# Patient Record
Sex: FEMALE | Race: WHITE | NOT HISPANIC OR LATINO | Employment: FULL TIME | URBAN - METROPOLITAN AREA
[De-identification: names, ages, dates, MRNs, and addresses within clinical notes are randomized per-mention and may not be internally consistent; named-entity substitution may affect disease eponyms.]

---

## 2017-08-02 ENCOUNTER — OFFICE VISIT (OUTPATIENT)
Dept: OTOLARYNGOLOGY | Facility: CLINIC | Age: 37
End: 2017-08-02
Payer: COMMERCIAL

## 2017-08-02 VITALS
HEART RATE: 78 BPM | BODY MASS INDEX: 21.85 KG/M2 | WEIGHT: 135.38 LBS | TEMPERATURE: 97 F | SYSTOLIC BLOOD PRESSURE: 115 MMHG | DIASTOLIC BLOOD PRESSURE: 74 MMHG

## 2017-08-02 DIAGNOSIS — J35.8 TONSILLAR MASS: Primary | ICD-10-CM

## 2017-08-02 PROCEDURE — 42800 BIOPSY OF THROAT: CPT | Mod: S$GLB,,, | Performed by: OTOLARYNGOLOGY

## 2017-08-02 PROCEDURE — 99203 OFFICE O/P NEW LOW 30 MIN: CPT | Mod: 25,S$GLB,, | Performed by: OTOLARYNGOLOGY

## 2017-08-02 PROCEDURE — 88305 TISSUE EXAM BY PATHOLOGIST: CPT | Performed by: PATHOLOGY

## 2017-08-02 PROCEDURE — 88305 TISSUE EXAM BY PATHOLOGIST: CPT | Mod: 26,,, | Performed by: PATHOLOGY

## 2017-08-02 PROCEDURE — 99999 PR PBB SHADOW E&M-EST. PATIENT-LVL III: CPT | Mod: PBBFAC,,, | Performed by: OTOLARYNGOLOGY

## 2017-08-02 NOTE — PROGRESS NOTES
Chief Complaint   Patient presents with    Consult     swollen tonsils/tickle feeling         37 y.o. female presents with one year history of left tonsillar mass. No associated pain or bleeding, but she does occasionally feel it in the back of her throat.       Review of Systems     Constitutional: Negative for fatigue and unexpected weight change.   HENT: per HPI.  Eyes: Negative for visual disturbance.   Respiratory: Negative for shortness of breath, hemoptysis  Cardiovascular: Negative for chest pain and palpitations.   Genitourinary: Negative for dysuria and difficulty urinating.   Musculoskeletal: Negative for decreased ROM, back pain.   Skin: Negative for rash, sunburn, itching.   Neurological: Negative for dizziness and seizures.   Hematological: Negative for adenopathy. Does not bruise/bleed easily.   Psychiatric/Behavioral: Negative for agitation. The patient is not nervous/anxious.   Endocrine: Negative for rapid weight loss/weight gain, heat/cold intolerance.     History reviewed. No pertinent past medical history.    Past Surgical History:   Procedure Laterality Date    ANKLE FRACTURE SURGERY  08/2014    plate and 5 screws    ANKLE SURGERY         family history is not on file.    Pt  reports that she has been smoking.  She has never used smokeless tobacco. She reports that she drinks alcohol. She reports that she does not use drugs.    Review of patient's allergies indicates:  No Known Allergies     Physical Exam    Vitals:    08/02/17 1318   BP: 115/74   Pulse: 78   Temp: 96.9 °F (36.1 °C)     Body mass index is 21.85 kg/m².    Physical Exam   Constitutional: She is oriented to person, place, and time. She appears well-developed and well-nourished. No distress.   HENT:   Head: Normocephalic and atraumatic.   Right Ear: Hearing, tympanic membrane, external ear and ear canal normal. Tympanic membrane mobility is normal. No middle ear effusion. No decreased hearing is noted.   Left Ear: Hearing,  tympanic membrane, external ear and ear canal normal. Tympanic membrane mobility is normal.  No middle ear effusion. No decreased hearing is noted.   Nose: Nose normal.   Mouth/Throat: Uvula is midline, oropharynx is clear and moist and mucous membranes are normal. No oral lesions. Tonsils are 1+ on the right. Tonsils are 1+ on the left.       Eyes: Conjunctivae, EOM and lids are normal. Pupils are equal, round, and reactive to light. Right eye exhibits no discharge. Left eye exhibits no discharge.   Neck: Trachea normal, normal range of motion and phonation normal. Neck supple. No tracheal tenderness present. No tracheal deviation, no edema and no erythema present. No thyroid mass and no thyromegaly present.   Cardiovascular: Normal heart sounds.    Pulmonary/Chest: Breath sounds normal. No stridor.   Abdominal: Soft.   Lymphadenopathy:     She has no cervical adenopathy.   Neurological: She is alert and oriented to person, place, and time.   Skin: Skin is warm and dry. No rash noted. She is not diaphoretic. No erythema. No pallor.   Psychiatric: She has a normal mood and affect.   Nursing note and vitals reviewed.      Procedure note:  Once informed consent was obtained, patient was sprayed with topical 4% lidocaine to the back of the throat. Once this took effect, the left tonsillar mass was grasped with a forceps and excised with a sharp scissor. Bleeding was minimal. She tolerated the procedure well.       Assessment     1. Tonsillar mass          Plan  In summary, Ms. Huang is a 37 year old female with one year history of left tonsillar polyp, excised today in clinic. I will call her with the pathology results. All questions were answered.

## 2019-08-19 ENCOUNTER — OFFICE VISIT (OUTPATIENT)
Dept: URGENT CARE | Facility: CLINIC | Age: 39
End: 2019-08-19
Payer: COMMERCIAL

## 2019-08-19 VITALS
TEMPERATURE: 98 F | RESPIRATION RATE: 20 BRPM | HEIGHT: 66 IN | BODY MASS INDEX: 21.21 KG/M2 | SYSTOLIC BLOOD PRESSURE: 110 MMHG | OXYGEN SATURATION: 99 % | WEIGHT: 132 LBS | HEART RATE: 83 BPM | DIASTOLIC BLOOD PRESSURE: 60 MMHG
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
## 2019-08-19 PROCEDURE — 99214 OFFICE O/P EST MOD 30 MIN: CPT | Mod: S$GLB,,, | Performed by: NURSE PRACTITIONER

## 2019-08-19 PROCEDURE — 99214 PR OFFICE/OUTPT VISIT, EST, LEVL IV, 30-39 MIN: ICD-10-PCS | Mod: S$GLB,,, | Performed by: NURSE PRACTITIONER

## 2019-08-19 RX ORDER — BENZONATATE 100 MG/1
CAPSULE ORAL
COMMUNITY

## 2019-08-19 RX ORDER — MUPIROCIN 20 MG/G
OINTMENT TOPICAL
Qty: 22 G | Refills: 1 | Status: SHIPPED | OUTPATIENT
Start: 2019-08-19

## 2019-08-19 NOTE — PROGRESS NOTES
"Subjective:       Patient ID: Celina Huang is a 39 y.o. female.    Vitals:  height is 5' 6" (1.676 m) and weight is 59.9 kg (132 lb). Her oral temperature is 98 °F (36.7 °C). Her blood pressure is 110/60 and her pulse is 83. Her respiration is 20 and oxygen saturation is 99%.     Chief Complaint: Insect Bite (Right upper arm)    Patient here today with c/o Right upper arm insect bite x's 4 days ago. Patient reports she did not see what bit you.    Insect Bite   This is a new problem. The current episode started in the past 7 days. The problem has been gradually worsening. Pertinent negatives include no abdominal pain, anorexia, arthralgias, change in bowel habit, chest pain, chills, congestion, coughing, diaphoresis, fatigue, fever, headaches, joint swelling, myalgias, nausea, neck pain, numbness, rash, sore throat, swollen glands, urinary symptoms, vertigo, visual change, vomiting or weakness. Nothing aggravates the symptoms. Treatments tried: T Tree oil. The treatment provided no relief.       Constitution: Negative for chills, sweating, fatigue and fever.   HENT: Negative for congestion and sore throat.    Neck: Negative for neck pain and painful lymph nodes.   Cardiovascular: Negative for chest pain and leg swelling.   Eyes: Negative for double vision and blurred vision.   Respiratory: Negative for cough and shortness of breath.    Gastrointestinal: Negative for abdominal pain, nausea, vomiting and diarrhea.   Genitourinary: Negative for dysuria, frequency, urgency and history of kidney stones.   Musculoskeletal: Negative for joint pain, joint swelling, muscle cramps and muscle ache.   Skin: Negative for color change, pale, rash and bruising.   Allergic/Immunologic: Negative for seasonal allergies.   Neurological: Negative for dizziness, history of vertigo, light-headedness, passing out, headaches and numbness.   Hematologic/Lymphatic: Negative for swollen lymph nodes.   Psychiatric/Behavioral: Negative " for nervous/anxious, sleep disturbance and depression. The patient is not nervous/anxious.        Objective:      Physical Exam   Constitutional: She is oriented to person, place, and time. She appears well-developed and well-nourished. She is cooperative.  Non-toxic appearance. She does not appear ill. No distress.   HENT:   Head: Normocephalic and atraumatic.   Right Ear: Hearing, tympanic membrane, external ear and ear canal normal.   Left Ear: Hearing, tympanic membrane, external ear and ear canal normal.   Nose: Nose normal. No mucosal edema, rhinorrhea or nasal deformity. No epistaxis. Right sinus exhibits no maxillary sinus tenderness and no frontal sinus tenderness. Left sinus exhibits no maxillary sinus tenderness and no frontal sinus tenderness.   Mouth/Throat: Uvula is midline, oropharynx is clear and moist and mucous membranes are normal. No trismus in the jaw. Normal dentition. No uvula swelling. No posterior oropharyngeal erythema.   Eyes: Conjunctivae and lids are normal. Right eye exhibits no discharge. Left eye exhibits no discharge. No scleral icterus.   Sclera clear bilat   Neck: Trachea normal, normal range of motion, full passive range of motion without pain and phonation normal. Neck supple.   Cardiovascular: Normal rate, regular rhythm, normal heart sounds, intact distal pulses and normal pulses.   Pulmonary/Chest: Effort normal and breath sounds normal. No respiratory distress.   Abdominal: Soft. Normal appearance and bowel sounds are normal. She exhibits no distension, no pulsatile midline mass and no mass. There is no tenderness.   Musculoskeletal: Normal range of motion. She exhibits no edema or deformity.   Neurological: She is alert and oriented to person, place, and time. She exhibits normal muscle tone. Coordination normal.   Skin: Skin is warm, dry and intact. She is not diaphoretic. No pallor.        Psychiatric: She has a normal mood and affect. Her speech is normal and behavior is  normal. Judgment and thought content normal. Cognition and memory are normal.   Nursing note and vitals reviewed.      Assessment:       1. Insect bite, initial encounter        Plan:       Patient has put tea tree oil and has used essential oils on insect bite.  Patient concerned that it is the poison a spider bite or gangrene.  Assured patient that it is not.  Given patient antibiotic ointment.  Patient has concerns other will mess with her GI karla.  Educated patient that is not absorbed is just topical. Attempted to take picture for chart Jeannie Castillo was down at that time.   Insect bite, initial encounter  -     mupirocin (BACTROBAN) 2 % ointment; Apply to affected area 3 times daily  Dispense: 22 g; Refill: 1      Patient Instructions     Patient to apply Bactroban 3 times daily.  Patient to keep it insect bite covered.    If symptoms persist or worsen such as increased tenderness, purulent drainage, or red streaks patient is to follow up immediately for further evaluation.      Insect Bite  Insects most often bite to protect themselves or their nests. Certain bugs, like fleas and mosquitoes, bite to feed. In some cases, the actual bite causes no pain. An itchy red welt or swelling may develop at the site of the bite. Most insect bites do not cause illness. And the itching and swelling most often go away without treatment. However, an infection can develop if the bite is scratched and the skin broken. Rarely, a person may have an allergic reaction to an insect bite.  If a stinger is visible at the bite spot, remove it as quickly as possible, as this can decrease the amount of venom that gets into your body. Scrape it out with a dull edge, such as the edge of a credit card. Try not to squeeze it. Do not try to dig it out, as you may damage the skin and also increase the chance of infection.     To help reduce swelling and itching, apply a cold pack or ice in a zip-top plastic bag wrapped in a thin towel.   Home  care  · Your healthcare provider may prescribe over-the-counter medicines to help relieve itching and swelling. Use each medicine according to the directions on the package. If the bite becomes infected, you will need an antibiotic. This may be in pill form taken by mouth or as an ointment or cream put directly on the skin. Be sure to use them exactly as prescribed.  · Bite symptoms usually go away on their own within a week or two.  · To help prevent infection, avoid scratching or picking at the bite.  · To help relieve itching and swelling, apply ice in a zip-top plastic bag wrapped in a thin towel to the bites. Do this for up to 10 minutes at a time. Avoid hot showers or baths as these tend to make itching worse.  · An over-the-counter anti-itch medicine such as calamine lotion or an antihistamine cream may be helpful.  · If you suspect you have insects in your home, talk to a licensed pest-control professional. He or she can inspect your home and tell you how to get rid of bugs safely.  Follow-up care  Follow up with your healthcare provider, or as advised.  Call 911  Call 911 if any of these occur:  · Trouble breathing or swallowing  · Wheezing  · Feeling like your throat is closing up  · Fainting, loss of consciousness  · Swelling around the face or mouth  When to seek medical advice  Call your healthcare provider right away if any of these occur:  · Fever of 100.4°F (38°C) or higher, or as directed by your healthcare provider  · Signs of infection, such as increased swelling and pain, warmth, red streaks, or drainage from the skin  · Signs of allergic reaction, such as hives, a spreading rash, or throat itching  Date Last Reviewed: 10/1/2016  © 7710-5162 [employer]. [address] [place] [address], [place] [address]. All rights reserved. This information is not intended as a substitute for professional medical care. Always follow your healthcare professional's instructions.

## 2019-08-19 NOTE — PATIENT INSTRUCTIONS
Patient to apply Bactroban 3 times daily.  Patient to keep it insect bite covered.    If symptoms persist or worsen such as increased tenderness, purulent drainage, or red streaks patient is to follow up immediately for further evaluation.      Insect Bite  Insects most often bite to protect themselves or their nests. Certain bugs, like fleas and mosquitoes, bite to feed. In some cases, the actual bite causes no pain. An itchy red welt or swelling may develop at the site of the bite. Most insect bites do not cause illness. And the itching and swelling most often go away without treatment. However, an infection can develop if the bite is scratched and the skin broken. Rarely, a person may have an allergic reaction to an insect bite.  If a stinger is visible at the bite spot, remove it as quickly as possible, as this can decrease the amount of venom that gets into your body. Scrape it out with a dull edge, such as the edge of a credit card. Try not to squeeze it. Do not try to dig it out, as you may damage the skin and also increase the chance of infection.     To help reduce swelling and itching, apply a cold pack or ice in a zip-top plastic bag wrapped in a thin towel.   Home care  · Your healthcare provider may prescribe over-the-counter medicines to help relieve itching and swelling. Use each medicine according to the directions on the package. If the bite becomes infected, you will need an antibiotic. This may be in pill form taken by mouth or as an ointment or cream put directly on the skin. Be sure to use them exactly as prescribed.  · Bite symptoms usually go away on their own within a week or two.  · To help prevent infection, avoid scratching or picking at the bite.  · To help relieve itching and swelling, apply ice in a zip-top plastic bag wrapped in a thin towel to the bites. Do this for up to 10 minutes at a time. Avoid hot showers or baths as these tend to make itching worse.  · An over-the-counter  anti-itch medicine such as calamine lotion or an antihistamine cream may be helpful.  · If you suspect you have insects in your home, talk to a licensed pest-control professional. He or she can inspect your home and tell you how to get rid of bugs safely.  Follow-up care  Follow up with your healthcare provider, or as advised.  Call 911  Call 911 if any of these occur:  · Trouble breathing or swallowing  · Wheezing  · Feeling like your throat is closing up  · Fainting, loss of consciousness  · Swelling around the face or mouth  When to seek medical advice  Call your healthcare provider right away if any of these occur:  · Fever of 100.4°F (38°C) or higher, or as directed by your healthcare provider  · Signs of infection, such as increased swelling and pain, warmth, red streaks, or drainage from the skin  · Signs of allergic reaction, such as hives, a spreading rash, or throat itching  Date Last Reviewed: 10/1/2016  © 3359-3217 [employer]. [address] [place] [address], [place] [address]. All rights reserved. This information is not intended as a substitute for professional medical care. Always follow your healthcare professional's instructions.

## 2023-06-08 ENCOUNTER — OFFICE VISIT (OUTPATIENT)
Dept: URGENT CARE | Facility: CLINIC | Age: 43
End: 2023-06-08
Payer: MEDICAID

## 2023-06-08 VITALS
HEIGHT: 66 IN | OXYGEN SATURATION: 98 % | BODY MASS INDEX: 21.21 KG/M2 | WEIGHT: 132 LBS | HEART RATE: 88 BPM | TEMPERATURE: 98 F | DIASTOLIC BLOOD PRESSURE: 76 MMHG | SYSTOLIC BLOOD PRESSURE: 134 MMHG | RESPIRATION RATE: 18 BRPM

## 2023-06-08 DIAGNOSIS — R52 BODY ACHES: ICD-10-CM

## 2023-06-08 DIAGNOSIS — R11.0 NAUSEA: ICD-10-CM

## 2023-06-08 DIAGNOSIS — Z20.822 EXPOSURE TO COVID-19 VIRUS: ICD-10-CM

## 2023-06-08 DIAGNOSIS — H92.09 OTALGIA, UNSPECIFIED LATERALITY: Primary | ICD-10-CM

## 2023-06-08 DIAGNOSIS — R51.9 ACUTE NONINTRACTABLE HEADACHE, UNSPECIFIED HEADACHE TYPE: ICD-10-CM

## 2023-06-08 LAB
CTP QC/QA: YES
SARS-COV-2 AG RESP QL IA.RAPID: NEGATIVE

## 2023-06-08 PROCEDURE — 99213 OFFICE O/P EST LOW 20 MIN: CPT | Mod: S$GLB,,, | Performed by: NURSE PRACTITIONER

## 2023-06-08 PROCEDURE — 99213 PR OFFICE/OUTPT VISIT, EST, LEVL III, 20-29 MIN: ICD-10-PCS | Mod: S$GLB,,, | Performed by: NURSE PRACTITIONER

## 2023-06-08 PROCEDURE — 87811 SARS CORONAVIRUS 2 ANTIGEN POCT, MANUAL READ: ICD-10-PCS | Mod: QW,S$GLB,, | Performed by: NURSE PRACTITIONER

## 2023-06-08 PROCEDURE — 87811 SARS-COV-2 COVID19 W/OPTIC: CPT | Mod: QW,S$GLB,, | Performed by: NURSE PRACTITIONER

## 2023-06-08 RX ORDER — ONDANSETRON 4 MG/1
4 TABLET, ORALLY DISINTEGRATING ORAL 2 TIMES DAILY
Qty: 40 TABLET | Refills: 0 | Status: SHIPPED | OUTPATIENT
Start: 2023-06-08 | End: 2023-06-28

## 2023-06-08 NOTE — PROGRESS NOTES
"Subjective:      Patient ID: Celina Huang is a 43 y.o. female.    Vitals:  height is 5' 6" (1.676 m) and weight is 59.9 kg (132 lb). Her temperature is 98.4 °F (36.9 °C). Her blood pressure is 134/76 and her pulse is 88. Her respiration is 18 and oxygen saturation is 98%.     Chief Complaint: Otalgia    Patient is a 44 yo female who presents with ear pain/bodyaches/nausea/headache. Patient stated symptoms started Sunday. Patient was exposed to covid. She has taken Advil for her headache with relief. She denies fever, vomiting, diarrhea, constipation.     Otalgia   There is pain in the left ear. This is a new problem. The current episode started in the past 7 days. The problem has been unchanged. Associated symptoms include abdominal pain, coughing and headaches. Pertinent negatives include no diarrhea, sore throat or vomiting. Treatments tried: zertec. The treatment provided no relief.     Constitution: Negative for chills and fever.   HENT:  Positive for ear pain and postnasal drip. Negative for congestion, sore throat and voice change.    Respiratory:  Positive for cough. Negative for shortness of breath.    Gastrointestinal:  Positive for abdominal pain and nausea. Negative for vomiting and diarrhea.   Neurological:  Positive for headaches.    Objective:     Physical Exam   Constitutional: She is oriented to person, place, and time. She appears well-developed. She is cooperative.  Non-toxic appearance. She does not appear ill. No distress.   HENT:   Head: Normocephalic and atraumatic.   Ears:   Right Ear: Hearing, tympanic membrane, external ear and ear canal normal.   Left Ear: Hearing, tympanic membrane, external ear and ear canal normal.   Nose: Nose normal. No mucosal edema, rhinorrhea or nasal deformity. No epistaxis. Right sinus exhibits no maxillary sinus tenderness and no frontal sinus tenderness. Left sinus exhibits no maxillary sinus tenderness and no frontal sinus tenderness.   Mouth/Throat: " Uvula is midline, oropharynx is clear and moist and mucous membranes are normal. No trismus in the jaw. Normal dentition. No uvula swelling. No oropharyngeal exudate, posterior oropharyngeal edema or posterior oropharyngeal erythema (mild with clear postnasal drainage).   Eyes: Conjunctivae and lids are normal. No scleral icterus.   Neck: Trachea normal and phonation normal. Neck supple. No edema present. No erythema present. No neck rigidity present.   Cardiovascular: Normal rate, regular rhythm, normal heart sounds and normal pulses.   Pulmonary/Chest: Effort normal and breath sounds normal. No respiratory distress. She has no decreased breath sounds. She has no rhonchi.   Abdominal: Normal appearance and bowel sounds are normal. Soft. flat abdomen   Musculoskeletal: Normal range of motion.         General: No deformity. Normal range of motion.   Neurological: She is alert and oriented to person, place, and time. She exhibits normal muscle tone. Coordination normal.   Skin: Skin is warm, dry, intact, not diaphoretic and not pale.   Psychiatric: Her speech is normal and behavior is normal. Judgment and thought content normal.   Nursing note and vitals reviewed.    Assessment:     1. Otalgia, unspecified laterality    2. Nausea    3. Acute nonintractable headache, unspecified headache type    4. Body aches    5. Exposure to COVID-19 virus        Plan:       Otalgia, unspecified laterality  -     SARS Coronavirus 2 Antigen, POCT Manual Read    Nausea    Acute nonintractable headache, unspecified headache type    Body aches    Exposure to COVID-19 virus          Medical Decision Making:   Initial Assessment:   Nontoxic appearing 42 yo female c/o body aches, headache, nausea and ear pain. She reports COVID exposure   Differential Diagnosis:   URI, allergic rhinitis, sinus pressure/drainage, pharyngitis,   Clinical Tests:   Lab Tests: Reviewed       <> Summary of Lab: COVID     Results for orders placed or performed in  visit on 06/08/23   SARS Coronavirus 2 Antigen, POCT Manual Read   Result Value Ref Range    SARS Coronavirus 2 Antigen Negative Negative     Acceptable Yes      Patient Instructions   Encourage fluids, eat a bland diet, take zofran as prescribed for nausea, treat postnasal drainage with zyrtec, take tylenol/ibuprofen for headache/body ache. Follow up with any new or worsening symptoms.

## 2024-10-23 NOTE — PROGRESS NOTES
"Chief Complaint: Well Woman Exam     HPI:      Celina is a 44 y.o.  who presents today for well woman exam.      Denies any breast, vaginal, urinary complaints or pelvic pain today.    Menses are regular. Patient's last menstrual period was 2024 (exact date).     Celina is not currently sexually active. She is currently using abstinence for contraception. She would like STI screening today.    Previous Pap: NILM (10/25/2024)  Previous Mammogram: No results found for this or any previous visit.    She denies family history of breast, GYN, colon, or  cancers.      PCP: Luis Martines MD    History reviewed. No pertinent past medical history.  No current outpatient medications on file.   Review of patient's allergies indicates:  No Known Allergies  Past Surgical History:   Procedure Laterality Date    ANKLE FRACTURE SURGERY  2014    plate and 5 screws    ANKLE SURGERY       Social History     Tobacco Use    Smoking status: Some Days    Smokeless tobacco: Never   Substance Use Topics    Alcohol use: Yes     Comment: social    Drug use: No     Family History   Problem Relation Name Age of Onset    Breast cancer Neg Hx      Colon cancer Neg Hx      Ovarian cancer Neg Hx      Colon polyps Neg Hx      Rectal cancer Neg Hx      Stomach cancer Neg Hx      Cirrhosis Neg Hx      Liver disease Neg Hx      Liver cancer Neg Hx         ROS:     GENERAL: Feeling well overall.   CARDIOVASCULAR: Denies palpitations or chest pain.   RESPIRATORY: Denies shortness of breath.  BREASTS: see HPI.  ABDOMEN: Denies constipation, diarrhea, blood in stool.  URINARY: see HPI.  REPRODUCTIVE: see HPI.  PSYCHIATRIC: Denies uncontrolled depression or anxiety.    Physical Exam:     /77 (Patient Position: Sitting)   Ht 5' 6" (1.676 m)   Wt 70.3 kg (154 lb 15.7 oz)   LMP 2024 (Exact Date)   BMI 25.01 kg/m²   Body mass index is 25.01 kg/m².     APPEARANCE: Well nourished, well developed, in no acute distress.  PSYCH: " Appropriate mood and affect.  SKIN: No acne or hirsutism.  CARDIOVASCULAR: Regular rate and rhythm. No edema of peripheral extremities.  PULMONARY: Effort and breath sounds normal.  NECK: Neck symmetric without masses. No thyromegaly.  NODES: No axillary lymph node enlargement.  BREASTS: Symmetrical, no visible skin lesions. No palpable masses. No nipple discharge bilaterally.  PELVIC: Normal external genitalia without lesions.  Normal hair distribution.  Adequate perineal body, normal urethral meatus.  Vagina moist and well rugated. Without lesions. Vagina without abnormal discharge.  Cervix without lesions, abnormal discharge, or tenderness.. No significant cystocele or rectocele.  Bimanual exam shows uterus to be normal size, regular, mobile and nontender.  Adnexa without masses or tenderness.      Verbal consent obtained    Chaperoned by: ELLIOT Eason MA    Assessment/Plan:     Routine gynecological examination  -     HPV High Risk Genotypes, PCR  -     Liquid-Based Pap Smear, Screening  -     C. trachomatis/N. gonorrhoeae by AMP DNA [employer]; Cervicovaginal  -     Hepatitis B Surface Antigen; Future; Expected date: 10/25/2024  -     Hepatitis C Antibody; Future; Expected date: 10/25/2024  -     HIV 1/2 Ag/Ab (4th Gen); Future; Expected date: 10/25/2024  -     Treponema Pallidium Antibodies IgG, IgM; Future; Expected date: 10/25/2024    Screening for cervical cancer  -     HPV High Risk Genotypes, PCR  -     Liquid-Based Pap Smear, Screening    Routine screening for STI (sexually transmitted infection)  -     C. trachomatis/N. gonorrhoeae by AMP DNA [employer]; Cervicovaginal  -     Hepatitis B Surface Antigen; Future; Expected date: 10/25/2024  -     Hepatitis C Antibody; Future; Expected date: 10/25/2024  -     HIV 1/2 Ag/Ab (4th Gen); Future; Expected date: 10/25/2024  -     Treponema Pallidium Antibodies IgG, IgM; Future; Expected date: 10/25/2024        PLAN:    Pap test collected  GC/CT collected; serum STI  screening ordered  Mammogram  declined  Contraception - abstinence     Follow up in about 1 year (around 10/25/2025) for WWE.    Counseling:     Patient was counseled today on the recommendation for yearly wellness exams, importance of breast self awareness and annual mammograms, as well as the current ASCCP pap guidelines. She was counseled on importance of regular exercise. She is to see her PCP for other health maintenance.     Use of the [facility] Patient Portal discussed and encouraged during today's visit.

## 2024-10-25 ENCOUNTER — OFFICE VISIT (OUTPATIENT)
Dept: OBSTETRICS AND GYNECOLOGY | Facility: CLINIC | Age: 44
End: 2024-10-25
Payer: MEDICAID

## 2024-10-25 ENCOUNTER — LAB VISIT (OUTPATIENT)
Dept: LAB | Facility: HOSPITAL | Age: 44
End: 2024-10-25
Payer: MEDICAID

## 2024-10-25 VITALS
HEIGHT: 66 IN | DIASTOLIC BLOOD PRESSURE: 77 MMHG | WEIGHT: 155 LBS | BODY MASS INDEX: 24.91 KG/M2 | SYSTOLIC BLOOD PRESSURE: 116 MMHG

## 2024-10-25 DIAGNOSIS — Z13.29 SCREENING FOR ENDOCRINE DISORDER: ICD-10-CM

## 2024-10-25 DIAGNOSIS — Z01.419 ROUTINE GYNECOLOGICAL EXAMINATION: Primary | ICD-10-CM

## 2024-10-25 DIAGNOSIS — L65.9 HAIR LOSS: ICD-10-CM

## 2024-10-25 DIAGNOSIS — Z11.3 ROUTINE SCREENING FOR STI (SEXUALLY TRANSMITTED INFECTION): ICD-10-CM

## 2024-10-25 DIAGNOSIS — Z12.4 SCREENING FOR CERVICAL CANCER: ICD-10-CM

## 2024-10-25 DIAGNOSIS — Z01.419 ROUTINE GYNECOLOGICAL EXAMINATION: ICD-10-CM

## 2024-10-25 LAB
ALBUMIN SERPL BCP-MCNC: 4.3 G/DL (ref 3.5–5.2)
ALP SERPL-CCNC: 53 U/L (ref 40–150)
ALT SERPL W/O P-5'-P-CCNC: 15 U/L (ref 10–44)
ANION GAP SERPL CALC-SCNC: 8 MMOL/L (ref 8–16)
AST SERPL-CCNC: 18 U/L (ref 10–40)
BASOPHILS # BLD AUTO: 0.07 K/UL (ref 0–0.2)
BASOPHILS NFR BLD: 1.3 % (ref 0–1.9)
BILIRUB SERPL-MCNC: 0.3 MG/DL (ref 0.1–1)
BUN SERPL-MCNC: 10 MG/DL (ref 6–20)
CALCIUM SERPL-MCNC: 9.5 MG/DL (ref 8.7–10.5)
CHLORIDE SERPL-SCNC: 106 MMOL/L (ref 95–110)
CHOLEST SERPL-MCNC: 178 MG/DL (ref 120–199)
CHOLEST/HDLC SERPL: 3.2 {RATIO} (ref 2–5)
CO2 SERPL-SCNC: 25 MMOL/L (ref 23–29)
CREAT SERPL-MCNC: 0.8 MG/DL (ref 0.5–1.4)
DIFFERENTIAL METHOD BLD: NORMAL
EOSINOPHIL # BLD AUTO: 0.3 K/UL (ref 0–0.5)
EOSINOPHIL NFR BLD: 4.6 % (ref 0–8)
ERYTHROCYTE [DISTWIDTH] IN BLOOD BY AUTOMATED COUNT: 13.3 % (ref 11.5–14.5)
EST. GFR  (NO RACE VARIABLE): >60 ML/MIN/1.73 M^2
ESTIMATED AVG GLUCOSE: 103 MG/DL (ref 68–131)
GLUCOSE SERPL-MCNC: 93 MG/DL (ref 70–110)
HBA1C MFR BLD: 5.2 % (ref 4–5.6)
HBV SURFACE AG SERPL QL IA: NORMAL
HCT VFR BLD AUTO: 41.2 % (ref 37–48.5)
HCV AB SERPL QL IA: NORMAL
HDLC SERPL-MCNC: 56 MG/DL (ref 40–75)
HDLC SERPL: 31.5 % (ref 20–50)
HGB BLD-MCNC: 13.9 G/DL (ref 12–16)
HIV 1+2 AB+HIV1 P24 AG SERPL QL IA: NORMAL
IMM GRANULOCYTES # BLD AUTO: 0.02 K/UL (ref 0–0.04)
IMM GRANULOCYTES NFR BLD AUTO: 0.4 % (ref 0–0.5)
LDLC SERPL CALC-MCNC: 102.4 MG/DL (ref 63–159)
LYMPHOCYTES # BLD AUTO: 1.8 K/UL (ref 1–4.8)
LYMPHOCYTES NFR BLD: 32.9 % (ref 18–48)
MCH RBC QN AUTO: 29.8 PG (ref 27–31)
MCHC RBC AUTO-ENTMCNC: 33.7 G/DL (ref 32–36)
MCV RBC AUTO: 88 FL (ref 82–98)
MONOCYTES # BLD AUTO: 0.5 K/UL (ref 0.3–1)
MONOCYTES NFR BLD: 9.3 % (ref 4–15)
NEUTROPHILS # BLD AUTO: 2.9 K/UL (ref 1.8–7.7)
NEUTROPHILS NFR BLD: 51.5 % (ref 38–73)
NONHDLC SERPL-MCNC: 122 MG/DL
NRBC BLD-RTO: 0 /100 WBC
PLATELET # BLD AUTO: 359 K/UL (ref 150–450)
PMV BLD AUTO: 9.8 FL (ref 9.2–12.9)
POTASSIUM SERPL-SCNC: 4.2 MMOL/L (ref 3.5–5.1)
PROT SERPL-MCNC: 7.4 G/DL (ref 6–8.4)
RBC # BLD AUTO: 4.67 M/UL (ref 4–5.4)
SODIUM SERPL-SCNC: 139 MMOL/L (ref 136–145)
T4 FREE SERPL-MCNC: 1 NG/DL (ref 0.71–1.51)
TREPONEMA PALLIDUM IGG+IGM AB [PRESENCE] IN SERUM OR PLASMA BY IMMUNOASSAY: NONREACTIVE
TRIGL SERPL-MCNC: 98 MG/DL (ref 30–150)
TSH SERPL DL<=0.005 MIU/L-ACNC: 0.99 UIU/ML (ref 0.4–4)
WBC # BLD AUTO: 5.6 K/UL (ref 3.9–12.7)

## 2024-10-25 PROCEDURE — 99212 OFFICE O/P EST SF 10 MIN: CPT | Mod: PBBFAC | Performed by: NURSE PRACTITIONER

## 2024-10-25 PROCEDURE — 86803 HEPATITIS C AB TEST: CPT | Performed by: NURSE PRACTITIONER

## 2024-10-25 PROCEDURE — 80061 LIPID PANEL: CPT | Performed by: NURSE PRACTITIONER

## 2024-10-25 PROCEDURE — 85025 COMPLETE CBC W/AUTO DIFF WBC: CPT | Performed by: NURSE PRACTITIONER

## 2024-10-25 PROCEDURE — 36415 COLL VENOUS BLD VENIPUNCTURE: CPT | Performed by: NURSE PRACTITIONER

## 2024-10-25 PROCEDURE — 87591 N.GONORRHOEAE DNA AMP PROB: CPT | Performed by: NURSE PRACTITIONER

## 2024-10-25 PROCEDURE — 99999 PR PBB SHADOW E&M-EST. PATIENT-LVL II: CPT | Mod: PBBFAC,,, | Performed by: NURSE PRACTITIONER

## 2024-10-25 PROCEDURE — 87340 HEPATITIS B SURFACE AG IA: CPT | Performed by: NURSE PRACTITIONER

## 2024-10-25 PROCEDURE — 83036 HEMOGLOBIN GLYCOSYLATED A1C: CPT | Performed by: NURSE PRACTITIONER

## 2024-10-25 PROCEDURE — 87491 CHLMYD TRACH DNA AMP PROBE: CPT | Performed by: NURSE PRACTITIONER

## 2024-10-25 PROCEDURE — 80053 COMPREHEN METABOLIC PANEL: CPT | Performed by: NURSE PRACTITIONER

## 2024-10-25 PROCEDURE — 84443 ASSAY THYROID STIM HORMONE: CPT | Performed by: NURSE PRACTITIONER

## 2024-10-25 PROCEDURE — 86593 SYPHILIS TEST NON-TREP QUANT: CPT | Performed by: NURSE PRACTITIONER

## 2024-10-25 PROCEDURE — 87389 HIV-1 AG W/HIV-1&-2 AB AG IA: CPT | Performed by: NURSE PRACTITIONER

## 2024-10-25 PROCEDURE — 84439 ASSAY OF FREE THYROXINE: CPT | Performed by: NURSE PRACTITIONER
